# Patient Record
Sex: FEMALE | Race: WHITE | NOT HISPANIC OR LATINO
[De-identification: names, ages, dates, MRNs, and addresses within clinical notes are randomized per-mention and may not be internally consistent; named-entity substitution may affect disease eponyms.]

---

## 2019-07-09 ENCOUNTER — FORM ENCOUNTER (OUTPATIENT)
Age: 44
End: 2019-07-09

## 2019-08-25 ENCOUNTER — FORM ENCOUNTER (OUTPATIENT)
Age: 44
End: 2019-08-25

## 2021-06-14 ENCOUNTER — FORM ENCOUNTER (OUTPATIENT)
Age: 46
End: 2021-06-14

## 2023-01-11 PROBLEM — Z00.00 ENCOUNTER FOR PREVENTIVE HEALTH EXAMINATION: Status: ACTIVE | Noted: 2023-01-11

## 2023-02-28 ENCOUNTER — APPOINTMENT (OUTPATIENT)
Dept: OBGYN | Facility: CLINIC | Age: 48
End: 2023-02-28
Payer: COMMERCIAL

## 2023-02-28 VITALS
BODY MASS INDEX: 40.81 KG/M2 | DIASTOLIC BLOOD PRESSURE: 78 MMHG | WEIGHT: 260 LBS | HEART RATE: 84 BPM | RESPIRATION RATE: 16 BRPM | SYSTOLIC BLOOD PRESSURE: 124 MMHG | HEIGHT: 67 IN | OXYGEN SATURATION: 98 %

## 2023-02-28 DIAGNOSIS — D25.9 LEIOMYOMA OF UTERUS, UNSPECIFIED: ICD-10-CM

## 2023-02-28 LAB
BILIRUB UR QL STRIP: NORMAL
CLARITY UR: CLEAR
COLLECTION METHOD: NORMAL
GLUCOSE UR-MCNC: NORMAL
HCG UR QL: 0.2 EU/DL
HCG UR QL: NEGATIVE
HGB UR QL STRIP.AUTO: NORMAL
KETONES UR-MCNC: NORMAL
LEUKOCYTE ESTERASE UR QL STRIP: NORMAL
NITRITE UR QL STRIP: NORMAL
PH UR STRIP: 5.5
PROT UR STRIP-MCNC: NORMAL
QUALITY CONTROL: YES
SP GR UR STRIP: 1.03

## 2023-02-28 PROCEDURE — 99386 PREV VISIT NEW AGE 40-64: CPT

## 2023-02-28 PROCEDURE — 81003 URINALYSIS AUTO W/O SCOPE: CPT | Mod: NC,QW

## 2023-02-28 PROCEDURE — 81025 URINE PREGNANCY TEST: CPT

## 2023-02-28 PROCEDURE — 82270 OCCULT BLOOD FECES: CPT

## 2023-02-28 NOTE — PHYSICAL EXAM
[Chaperone Present] : A chaperone was present in the examining room during all aspects of the physical examination [Appropriately responsive] : appropriately responsive [Alert] : alert [No Acute Distress] : no acute distress [No Lymphadenopathy] : no lymphadenopathy [Regular Rate Rhythm] : regular rate rhythm [No Murmurs] : no murmurs [Clear to Auscultation B/L] : clear to auscultation bilaterally [Soft] : soft [Non-tender] : non-tender [Non-distended] : non-distended [No HSM] : No HSM [No Lesions] : no lesions [No Mass] : no mass [Oriented x3] : oriented x3 [Examination Of The Breasts] : a normal appearance [No Masses] : no breast masses were palpable [Labia Majora] : normal [Labia Minora] : normal [Normal] : normal [Uterine Adnexae] : normal [FreeTextEntry1] : The documentation for this encounter was entered by Sal Watkins acting as a scribe for Dr. Oden.  [Occult Blood Positive] : was negative for occult blood analysis [FreeTextEntry6] : small fibroid uterus

## 2023-02-28 NOTE — HISTORY OF PRESENT ILLNESS
[N] : Patient is not sexually active [Y] : Positive pregnancy history [Hot Flashes] : hot flashes [Night Sweats] : night sweats [Currently In Menopause] : currently in menopause [Experiencing Menopausal Sxs] : experiencing menopausal symptoms [Currently Active] : currently active [Mammogramdate] : 08/19 [BreastSonogramDate] : 08/19 [PapSmeardate] : 02/22 [LMPDate] : 09/01/23 [PGHxTotal] : 3 [TextBox_6] : 09/01/23 [FreeTextEntry1] : 09/01/23

## 2023-03-02 LAB
HPV 16 E6+E7 MRNA CVX QL NAA+PROBE: NOT DETECTED
HPV18+45 E6+E7 MRNA CVX QL NAA+PROBE: NOT DETECTED

## 2023-03-03 LAB — CYTOLOGY CVX/VAG DOC THIN PREP: NORMAL

## 2024-01-10 ENCOUNTER — APPOINTMENT (OUTPATIENT)
Dept: ENDOCRINOLOGY | Facility: CLINIC | Age: 49
End: 2024-01-10
Payer: COMMERCIAL

## 2024-01-10 VITALS
OXYGEN SATURATION: 97 % | RESPIRATION RATE: 18 BRPM | SYSTOLIC BLOOD PRESSURE: 139 MMHG | BODY MASS INDEX: 41.44 KG/M2 | HEART RATE: 76 BPM | HEIGHT: 67 IN | DIASTOLIC BLOOD PRESSURE: 90 MMHG | WEIGHT: 264 LBS

## 2024-01-10 DIAGNOSIS — Z13.820 ENCOUNTER FOR SCREENING FOR OSTEOPOROSIS: ICD-10-CM

## 2024-01-10 DIAGNOSIS — E53.8 DEFICIENCY OF OTHER SPECIFIED B GROUP VITAMINS: ICD-10-CM

## 2024-01-10 DIAGNOSIS — E55.9 VITAMIN D DEFICIENCY, UNSPECIFIED: ICD-10-CM

## 2024-01-10 PROCEDURE — 99205 OFFICE O/P NEW HI 60 MIN: CPT

## 2024-01-10 NOTE — ASSESSMENT
[FreeTextEntry1] : 48 year old female  with a past medical history of Hashimoto's hypothyroidism, Vitamin D deficiency, B12 deficiency who presents for management  1.  Hypothyroidism  Hashimoto's Thyroiditis  Will repeat labs and also check thyroid antibodies Blood was drawn in the office today Continue levothyroxine 125 mcg QD Continue liothyronine 5 mcg daily  2.  Vitamin B-12 deficiency Will check vitamin B12 levels  3. vitamin D deficiency Will check vitamin D  4.  Menopause Will check hormones to confirm Screen for osteoporosis given her early presentation

## 2024-01-19 LAB
25(OH)D3 SERPL-MCNC: 47.2 NG/ML
ALBUMIN SERPL ELPH-MCNC: 5.4 G/DL
ALP BLD-CCNC: 60 U/L
ALT SERPL-CCNC: 45 U/L
ANION GAP SERPL CALC-SCNC: 14 MMOL/L
AST SERPL-CCNC: 30 U/L
BILIRUB SERPL-MCNC: 1.9 MG/DL
BUN SERPL-MCNC: 18 MG/DL
CALCIUM SERPL-MCNC: 10.1 MG/DL
CHLORIDE SERPL-SCNC: 104 MMOL/L
CHOLEST SERPL-MCNC: 215 MG/DL
CO2 SERPL-SCNC: 24 MMOL/L
CREAT SERPL-MCNC: 1.16 MG/DL
EGFR: 58 ML/MIN/1.73M2
ESTIMATED AVERAGE GLUCOSE: 114 MG/DL
ESTRADIOL SERPL-MCNC: 18 PG/ML
FOLATE SERPL-MCNC: 14.9 NG/ML
FSH SERPL-MCNC: 87.7 IU/L
GLUCOSE SERPL-MCNC: 111 MG/DL
HBA1C MFR BLD HPLC: 5.6 %
HCT VFR BLD CALC: 47 %
HDLC SERPL-MCNC: 116 MG/DL
HGB BLD-MCNC: 15.2 G/DL
LDLC SERPL CALC-MCNC: 87 MG/DL
MCHC RBC-ENTMCNC: 31.7 PG
MCHC RBC-ENTMCNC: 32.3 GM/DL
MCV RBC AUTO: 97.9 FL
NONHDLC SERPL-MCNC: 99 MG/DL
PLATELET # BLD AUTO: 296 K/UL
POTASSIUM SERPL-SCNC: 4.3 MMOL/L
PROT SERPL-MCNC: 7.4 G/DL
RBC # BLD: 4.8 M/UL
RBC # FLD: 12.7 %
SODIUM SERPL-SCNC: 142 MMOL/L
T4 FREE SERPL-MCNC: 1.7 NG/DL
THYROGLOB AB SERPL-ACNC: <20 IU/ML
THYROPEROXIDASE AB SERPL IA-ACNC: 659 IU/ML
TRIGL SERPL-MCNC: 73 MG/DL
TSH SERPL-ACNC: 0.71 UIU/ML
VIT B12 SERPL-MCNC: 1360 PG/ML
WBC # FLD AUTO: 7.12 K/UL

## 2024-01-20 ENCOUNTER — APPOINTMENT (OUTPATIENT)
Dept: RADIOLOGY | Facility: CLINIC | Age: 49
End: 2024-01-20
Payer: COMMERCIAL

## 2024-01-20 PROCEDURE — 77080 DXA BONE DENSITY AXIAL: CPT

## 2024-05-30 LAB
25(OH)D3 SERPL-MCNC: 51 NG/ML
ALBUMIN SERPL ELPH-MCNC: 4.9 G/DL
ALP BLD-CCNC: 57 U/L
ALT SERPL-CCNC: 45 U/L
ANION GAP SERPL CALC-SCNC: 12 MMOL/L
AST SERPL-CCNC: 31 U/L
BILIRUB SERPL-MCNC: 1.4 MG/DL
BUN SERPL-MCNC: 17 MG/DL
CALCIUM SERPL-MCNC: 10.1 MG/DL
CHLORIDE SERPL-SCNC: 104 MMOL/L
CHOLEST SERPL-MCNC: 313 MG/DL
CO2 SERPL-SCNC: 23 MMOL/L
CREAT SERPL-MCNC: 1.2 MG/DL
EGFR: 55 ML/MIN/1.73M2
ESTRADIOL SERPL-MCNC: 8 PG/ML
FSH SERPL-MCNC: 83.7 IU/L
GLUCOSE SERPL-MCNC: 105 MG/DL
HCT VFR BLD CALC: 42.1 %
HDLC SERPL-MCNC: 107 MG/DL
HGB BLD-MCNC: 14.3 G/DL
LDLC SERPL CALC-MCNC: 187 MG/DL
MCHC RBC-ENTMCNC: 31.6 PG
MCHC RBC-ENTMCNC: 34 GM/DL
MCV RBC AUTO: 92.9 FL
NONHDLC SERPL-MCNC: 206 MG/DL
PLATELET # BLD AUTO: 293 K/UL
POTASSIUM SERPL-SCNC: 4.8 MMOL/L
PROT SERPL-MCNC: 7 G/DL
RBC # BLD: 4.53 M/UL
RBC # FLD: 12.6 %
SODIUM SERPL-SCNC: 139 MMOL/L
T3FREE SERPL-MCNC: 3.18 PG/ML
T4 SERPL-MCNC: 7.3 UG/DL
TRIGL SERPL-MCNC: 116 MG/DL
TSH SERPL-ACNC: 0.65 UIU/ML
VIT B12 SERPL-MCNC: 955 PG/ML
WBC # FLD AUTO: 6.81 K/UL

## 2024-06-05 ENCOUNTER — APPOINTMENT (OUTPATIENT)
Dept: ENDOCRINOLOGY | Facility: CLINIC | Age: 49
End: 2024-06-05
Payer: COMMERCIAL

## 2024-06-05 VITALS
SYSTOLIC BLOOD PRESSURE: 119 MMHG | DIASTOLIC BLOOD PRESSURE: 81 MMHG | HEIGHT: 67 IN | WEIGHT: 269 LBS | HEART RATE: 68 BPM | OXYGEN SATURATION: 97 % | BODY MASS INDEX: 42.22 KG/M2

## 2024-06-05 DIAGNOSIS — Z78.0 ASYMPTOMATIC MENOPAUSAL STATE: ICD-10-CM

## 2024-06-05 PROCEDURE — G2211 COMPLEX E/M VISIT ADD ON: CPT | Mod: NC

## 2024-06-05 PROCEDURE — 99214 OFFICE O/P EST MOD 30 MIN: CPT | Mod: 25

## 2024-06-05 RX ORDER — LEVOTHYROXINE SODIUM 0.12 MG/1
125 TABLET ORAL DAILY
Qty: 90 | Refills: 3 | Status: ACTIVE | COMMUNITY
Start: 1900-01-01 | End: 1900-01-01

## 2024-06-05 RX ORDER — LIOTHYRONINE SODIUM 5 UG/1
5 TABLET ORAL
Qty: 90 | Refills: 3 | Status: ACTIVE | COMMUNITY
Start: 1900-01-01 | End: 1900-01-01

## 2024-06-05 NOTE — ASSESSMENT
[FreeTextEntry1] : 49 year old female  with a past medical history of Hashimoto's hypothyroidism, Vitamin D deficiency, B12 deficiency who presents for management  1. Hypothyroidism  Hashimoto's Thyroiditis  Levels are stable Continue levothyroxine 125 mcg QD Continue liothyronine 5 mcg daily  2.  Vitamin B-12 deficiency Stable  3. vitamin D deficiency Stable  4.  Obesity Will refer to weight loss program

## 2024-06-05 NOTE — HISTORY OF PRESENT ILLNESS
[FreeTextEntry1] : Ms. DONOVAN ARELLANO is a 49 year old female  with a past medical history of Hashimoto's hypothyroidism, Vitamin D deficiency, B12 deficiency who presents for management.  Patient feels well overall.  She is struggling with losing weight.

## 2024-06-18 ENCOUNTER — NON-APPOINTMENT (OUTPATIENT)
Age: 49
End: 2024-06-18

## 2024-06-18 DIAGNOSIS — Z12.39 ENCOUNTER FOR OTHER SCREENING FOR MALIGNANT NEOPLASM OF BREAST: ICD-10-CM

## 2024-06-18 DIAGNOSIS — Z11.3 ENCOUNTER FOR SCREENING FOR INFECTIONS WITH A PREDOMINANTLY SEXUAL MODE OF TRANSMISSION: ICD-10-CM

## 2024-06-19 ENCOUNTER — APPOINTMENT (OUTPATIENT)
Dept: OBGYN | Facility: CLINIC | Age: 49
End: 2024-06-19
Payer: COMMERCIAL

## 2024-06-19 ENCOUNTER — LABORATORY RESULT (OUTPATIENT)
Age: 49
End: 2024-06-19

## 2024-06-19 VITALS
HEIGHT: 67 IN | HEART RATE: 100 BPM | SYSTOLIC BLOOD PRESSURE: 140 MMHG | BODY MASS INDEX: 42.38 KG/M2 | OXYGEN SATURATION: 98 % | RESPIRATION RATE: 18 BRPM | DIASTOLIC BLOOD PRESSURE: 82 MMHG | WEIGHT: 270 LBS

## 2024-06-19 DIAGNOSIS — Z82.49 FAMILY HISTORY OF ISCHEMIC HEART DISEASE AND OTHER DISEASES OF THE CIRCULATORY SYSTEM: ICD-10-CM

## 2024-06-19 DIAGNOSIS — Z12.39 ENCOUNTER FOR OTHER SCREENING FOR MALIGNANT NEOPLASM OF BREAST: ICD-10-CM

## 2024-06-19 DIAGNOSIS — Z86.39 PERSONAL HISTORY OF OTHER ENDOCRINE, NUTRITIONAL AND METABOLIC DISEASE: ICD-10-CM

## 2024-06-19 DIAGNOSIS — Z01.419 ENCOUNTER FOR GYNECOLOGICAL EXAMINATION (GENERAL) (ROUTINE) W/OUT ABNORMAL FINDINGS: ICD-10-CM

## 2024-06-19 DIAGNOSIS — Z13.31 ENCOUNTER FOR SCREENING FOR DEPRESSION: ICD-10-CM

## 2024-06-19 DIAGNOSIS — Z12.11 ENCOUNTER FOR SCREENING FOR MALIGNANT NEOPLASM OF COLON: ICD-10-CM

## 2024-06-19 DIAGNOSIS — Z83.438 FAMILY HISTORY OF OTHER DISORDER OF LIPOPROTEIN METABOLISM AND OTHER LIPIDEMIA: ICD-10-CM

## 2024-06-19 DIAGNOSIS — Z12.4 ENCOUNTER FOR SCREENING FOR MALIGNANT NEOPLASM OF CERVIX: ICD-10-CM

## 2024-06-19 PROCEDURE — 82270 OCCULT BLOOD FECES: CPT

## 2024-06-19 PROCEDURE — 99396 PREV VISIT EST AGE 40-64: CPT

## 2024-06-19 PROCEDURE — 99459 PELVIC EXAMINATION: CPT

## 2024-06-19 NOTE — PLAN
[FreeTextEntry1] : 1)  Self breast exam instructions, mammography discussed with the patient. 2)  Lipid profile assessment, TSH screening, fasting glucose testing, and vitamin D supplementation were discussed with the patient. 3)  Maintain healthy weight. 4)  Regular health maintenance with PCP. 5)  Remain tobacco free. 6)  Limit alcohol intake to less than 5 drinks per week. 7)  Osteoporosis screening and colonoscopy screening. 8)  Annual cholesterol screening. 9)  Annual influenza vaccine. 10) The importance of routine physical activity was reviewed and a goal of 150 minutes of moderately vigorous exercise per week was endorsed.   Yearly breast cancer screening with no current clinical or radiographic concerns. The patient was reminded regarding future well breast and general healthcare, breast cancer risk reduction, the importance of self-examination and the need for follow up. She was again reminded of the need to take Vit D3 2500 IU daily or to keep a Vit D level above 30, and Turmeric 1000 mg daily with Black Pepper. Plan continued yearly imaging and breast follow up, sooner as needed. I counseled the patient on current recommendations to reduce breast cancer risk including but not inclusive to regular exercise 20-30 minutes 3-4 times a week, low fat diet, limiting alcohol consumption, maintenance of ideal body weight, yearly imaging and self-breast awareness. Questions answered. I encouraged in light of COVID-19, social distancing, frequent hand washing and precautions to stay healthy.   I have spent 40 minutes of time on this encounter. Greater than 50% of the face-to-face encounter time was spent on counseling and/or coordination of care for examination findings, differential, testing, management and planning.

## 2024-06-19 NOTE — HISTORY OF PRESENT ILLNESS
[N] : Patient is not sexually active [Y] : Positive pregnancy history [Yes] : pregnancy [Hot Flashes] : hot flashes [Experiencing Menopausal Sxs] : experiencing menopausal symptoms [No] : Patient does not have concerns regarding sex [Previously active] : previously active [Men] : men [TextBox_4] : The 49-year-old patient presents today for a routine GYN exam. Patient is doing well. She offers no complaints. We reviewed together in detail her past medical and surgical histories, allergies and medication usage, social and family history. All questions were answered in easy-to-understand language. [Mammogramdate] : 4/22/2023 [BreastSonogramDate] : 4/22/2023 [PapSmeardate] : 2/28/2023 [BoneDensityDate] : 1/20/2024 [TextBox_78] : no hx [LMPDate] : 2023 [PGHxTotal] : 3 [[facility]] : [facility]

## 2024-06-19 NOTE — PHYSICAL EXAM
[Chaperone Present] : A chaperone was present in the examining room during all aspects of the physical examination [[id number]] : A chaperone was present during the pelvic exam. [Appropriately responsive] : appropriately responsive [Alert] : alert [No Acute Distress] : no acute distress [No Lymphadenopathy] : no lymphadenopathy [Soft] : soft [Non-tender] : non-tender [Non-distended] : non-distended [No HSM] : No HSM [No Lesions] : no lesions [No Mass] : no mass [Oriented x3] : oriented x3 [Examination Of The Breasts] : a normal appearance [No Masses] : no breast masses were palpable [Labia Majora] : normal [Labia Minora] : normal [Uterine Adnexae] : normal [Normal rectal exam] : was normal [Normal Brown Stool] : was normal and brown [Normal] : was normal [None] : there was no rectal mass  [FreeTextEntry2] : Vivienne Gresham [FreeTextEntry3] : This note was written by Vivienne Gresham on 06/19/2024, acting as a scribe for Dr. Mahin Oden MD. All medic record entries were at my, Dr. Mahin Oden MD, direction and personally dictated by me in 06/19/2024. I have personally reviewed the chart and agree that the record accurately reflects my personal performance of the history, physical exam, assessment, and plan.  [Occult Blood Positive] : was negative for occult blood analysis [Internal Hemorrhoid] : no internal hemorrhoids were present [External Hemorrhoid] : no external hemorrhoids were present [Skin Tags] : no residual hemorrhoidal skin tags

## 2024-06-24 LAB — CYTOLOGY CVX/VAG DOC THIN PREP: NORMAL

## 2024-07-10 ENCOUNTER — APPOINTMENT (OUTPATIENT)
Dept: ENDOCRINOLOGY | Facility: CLINIC | Age: 49
End: 2024-07-10

## 2024-07-23 ENCOUNTER — APPOINTMENT (OUTPATIENT)
Dept: BARIATRICS | Facility: CLINIC | Age: 49
End: 2024-07-23
Payer: COMMERCIAL

## 2024-07-23 ENCOUNTER — TRANSCRIPTION ENCOUNTER (OUTPATIENT)
Age: 49
End: 2024-07-23

## 2024-07-23 VITALS
DIASTOLIC BLOOD PRESSURE: 74 MMHG | TEMPERATURE: 97.2 F | SYSTOLIC BLOOD PRESSURE: 130 MMHG | WEIGHT: 271.6 LBS | BODY MASS INDEX: 42.63 KG/M2 | HEIGHT: 67 IN

## 2024-07-23 DIAGNOSIS — Z78.9 OTHER SPECIFIED HEALTH STATUS: ICD-10-CM

## 2024-07-23 DIAGNOSIS — R63.8 OTHER SYMPTOMS AND SIGNS CONCERNING FOOD AND FLUID INTAKE: ICD-10-CM

## 2024-07-23 DIAGNOSIS — Z76.89 PERSONS ENCOUNTERING HEALTH SERVICES IN OTHER SPECIFIED CIRCUMSTANCES: ICD-10-CM

## 2024-07-23 DIAGNOSIS — E66.01 MORBID (SEVERE) OBESITY DUE TO EXCESS CALORIES: ICD-10-CM

## 2024-07-23 DIAGNOSIS — R63.5 ABNORMAL WEIGHT GAIN: ICD-10-CM

## 2024-07-23 DIAGNOSIS — E46 UNSPECIFIED PROTEIN-CALORIE MALNUTRITION: ICD-10-CM

## 2024-07-23 DIAGNOSIS — R63.2 POLYPHAGIA: ICD-10-CM

## 2024-07-23 PROCEDURE — 99205 OFFICE O/P NEW HI 60 MIN: CPT

## 2024-07-24 LAB — HCG UR QL: NEGATIVE

## 2024-07-24 RX ORDER — SEMAGLUTIDE 0.25 MG/.5ML
0.25 INJECTION, SOLUTION SUBCUTANEOUS
Qty: 1 | Refills: 0 | Status: ACTIVE | COMMUNITY
Start: 2024-07-24 | End: 1900-01-01

## 2024-07-24 NOTE — HISTORY OF PRESENT ILLNESS
[de-identified] : DONOVAN ARELLANO is a 49-year-old F with a long-standing Hx of obesity who presents today for initial evaluation for weight management options.   Heaviest/current wt 275 lbs/271 lbs Diets/exercise programs tried in the past: WW (50 lb weight loss), Keto- diet Weight loss medications tried in the past: no   PMH and FH of: pancreatitis: no gallstones: no kidney stones: no MEN syndrome: no Medullary thyroid cancer: no Anxiety: no Glaucoma: no Blind in one eye or uncorrectable vision: no Currently taking narcotic pain medication(s) or suboxone: no   History of bariatric surgery: no Obesity comorbidities: none Anti-obesity medication: none   Current dietary lifestyle: Breakfast: Eggs, avocado, coffee OR low carbohydrate English muffin Lunch: Dinner leftovers Dinner: Chicken or beef with vegetables and salad Snacks: pretzels, yogurt, almonds, fruit Drinks: coffee, water   Activity Lifestyle: Sleep: ~8 hrs/night (no snoring) Physical activity/exercise: daily activity (~8-10k steps/day), [employer] Work:  Smoking/EtOH: nonsmoker; no EtOH use   Last Mammogram: 6/2024 Last Pap Smear: 6/19/24

## 2024-07-24 NOTE — PHYSICAL EXAM
[Obese, well nourished, in no acute distress] : obese, well nourished, in no acute distress [Normal] : affect appropriate [de-identified] : No visualized JVD or audible bruits  [de-identified] : Equal chest rise, non-labored respirations, no audible wheezing.  [de-identified] : soft, NT, ND, no evidence of hernia; diastasis observed; obese

## 2024-07-24 NOTE — HISTORY OF PRESENT ILLNESS
[de-identified] : DONOVAN ARELLANO is a 49-year-old F with a long-standing Hx of obesity who presents today for initial evaluation for weight management options.   Heaviest/current wt 275 lbs/271 lbs Diets/exercise programs tried in the past: WW (50 lb weight loss), Keto- diet Weight loss medications tried in the past: no   PMH and FH of: pancreatitis: no gallstones: no kidney stones: no MEN syndrome: no Medullary thyroid cancer: no Anxiety: no Glaucoma: no Blind in one eye or uncorrectable vision: no Currently taking narcotic pain medication(s) or suboxone: no   History of bariatric surgery: no Obesity comorbidities: none Anti-obesity medication: none   Current dietary lifestyle: Breakfast: Eggs, avocado, coffee OR low carbohydrate English muffin Lunch: Dinner leftovers Dinner: Chicken or beef with vegetables and salad Snacks: pretzels, yogurt, almonds, fruit Drinks: coffee, water   Activity Lifestyle: Sleep: ~8 hrs/night (no snoring) Physical activity/exercise: daily activity (~8-10k steps/day), [employer] Work:  Smoking/EtOH: nonsmoker; no EtOH use   Last Mammogram: 6/2024 Last Pap Smear: 6/19/24

## 2024-07-24 NOTE — PHYSICAL EXAM
[Obese, well nourished, in no acute distress] : obese, well nourished, in no acute distress [Normal] : affect appropriate [de-identified] : No visualized JVD or audible bruits  [de-identified] : Equal chest rise, non-labored respirations, no audible wheezing.  [de-identified] : soft, NT, ND, no evidence of hernia; diastasis observed; obese

## 2024-07-24 NOTE — ASSESSMENT
[FreeTextEntry1] : DONOVAN ARELLANO is a 49-year-old F with a long-standing h/o obesity, who presents today for initial evaluation for weight management options.   Had a lengthy discussion with the patient regarding nutrition, exercise, weight loss medications, and bariatric surgery.  Discussed how bariatric surgery may offer greater weight loss results with better long-term success. The patient qualifies for and is most interested in weight loss medications.   Health maintenance and behavioral/nutrition counseling for obesity: An additional 30 minutes of the visit was spent counseling the patient regarding need for aggressive weight loss and behavior modification including nutrition and proper eating habits, including which foods to eat and avoid.   Emphasized the importance of making healthier food choices including fresh fruits and vegetables, lean meats, and protein sources. Recommended front loading calories, incorporating whole grains, and eliminating fast foods. I also discussed the importance of avoiding fried/fatty foods and foods containing high sugar content including juices/shakes/sodas/desserts.   Also encouraged beginning an exercise program and recommended cardiovascular exercises along with strength training to build lean muscle. Made suggestions on different types of exercises to try.   Patient will work on the following:   -Focus on eating 3 well-balanced meals with lean protein and fiber during the daytime with appropriate portion size  -Cooking fresh meals rather than take out/processed/ready-made foods  -Try to make vegetables the largest portion of each meal, followed by a lean protein (e.g. lentils, chicken, fish,    beans), and finally a complex carbohydrate if still hungry (e.g. sweet potato, farro, quinoa)   -Consider bison burger instead of beef burger  -Limit almonds to 6-12/day  -Limit fruit to two pieces per day  -Incorporating exercise (aim for 150 mins/week with both cardio- and strength-training)   Start GLP-1 agonist based on authorization and labs (ordered). Currently there are no contraindications for the use of GLP-1 agonist after reviewing the pt's medical history and labs, including personal and family history of thyroid cancer or MEN 2 Syndrome. Possible side effects of GLP-1 agonist were discussed with the patient, including nausea, abdominal pain, reflux, constipation, delayed gastric emptying, gallstones, kidney stones, visual changes, and pancreatitis.  Instructions for use provided. Pt understands the need for long-term use and understands the risk of weight-gain upon stopping weight loss medications.  If GLP-1 agonist is not covered by the pt's insurance, will discuss starting the pt on Phentermine-Topiramate. Currently there are no contraindications for the use of Phentermine-Topiramate after reviewing the patient's medical history and labs, including CAD, arrhythmias, severe anxiety, Hx of substance abuse or glaucoma. Possible side effects were discussed with the pt including increased anxiety, tachycardia, elevated BP, paresthesia, and birth defects in the case of pregnancy.  Instructions for use provided. Pt understands the need for long-term use and understands the risk of weight-gain upon stopping weight loss medications.   Patient educated to call with questions/concerns All questions answered Return to office 3 weeks after starting GLP-1 agonist; call the office right away with any side effects   Additional time spent before and after visit reviewing chart.

## 2024-10-15 ENCOUNTER — APPOINTMENT (OUTPATIENT)
Dept: BARIATRICS | Facility: CLINIC | Age: 49
End: 2024-10-15
Payer: COMMERCIAL

## 2024-10-15 VITALS
HEIGHT: 67 IN | DIASTOLIC BLOOD PRESSURE: 80 MMHG | BODY MASS INDEX: 41.14 KG/M2 | OXYGEN SATURATION: 96 % | HEART RATE: 63 BPM | TEMPERATURE: 98 F | SYSTOLIC BLOOD PRESSURE: 130 MMHG | WEIGHT: 262.11 LBS

## 2024-10-15 DIAGNOSIS — K59.03 DRUG INDUCED CONSTIPATION: ICD-10-CM

## 2024-10-15 DIAGNOSIS — Z79.899 OTHER LONG TERM (CURRENT) DRUG THERAPY: ICD-10-CM

## 2024-10-15 DIAGNOSIS — E66.01 MORBID (SEVERE) OBESITY DUE TO EXCESS CALORIES: ICD-10-CM

## 2024-10-15 DIAGNOSIS — R63.4 ABNORMAL WEIGHT LOSS: ICD-10-CM

## 2024-10-15 DIAGNOSIS — Z71.82 EXERCISE COUNSELING: ICD-10-CM

## 2024-10-15 PROCEDURE — G2211 COMPLEX E/M VISIT ADD ON: CPT | Mod: NC

## 2024-10-15 PROCEDURE — 99214 OFFICE O/P EST MOD 30 MIN: CPT

## 2024-10-15 RX ORDER — CHOLECALCIFEROL (VITAMIN D3) 25 MCG
TABLET ORAL DAILY
Refills: 0 | Status: ACTIVE | COMMUNITY

## 2024-10-15 NOTE — ASSESSMENT
[FreeTextEntry1] : 49 year old F with a long-standing h/o obesity, who presents today for follow up after starting weight loss medication Wegovy. Weight loss since last visit. Reports mild constipation that is relieved with increasing water intake.  Doing well overall.   Reviewed guidelines about nutrition and snacking - protein focus diet with 3 meals/day Discussed food choices and timing of meals with front loading early in the day Avoid processed/refined foods Maintain adequate fiber in diet - Colace or Miralax as needed for constipation, constipation remedies list given to pt Maintain daily exercise regimen incorporating cardio and strength training as tolerated Discussed importance of strength training Exercise packet given and exercises reviewed with pt Daily goal 8-10k steps/day   Return to office in 1 month Increase Wegovy to 0.5mg Prescription sent to pt's local pharmacy Fasting labs to be done Mar 2025 Instructed pt to call the office sooner for issues/concerns All questions answered     Additional time spent before and after visit reviewing chart

## 2024-10-15 NOTE — PHYSICAL EXAM
[Normal] : affect appropriate [de-identified] : Well, healthy appearing adult  [de-identified] : EOMI, no conjunctival injection, anicteric  [de-identified] : No visualized masses, JVD or audible bruits noted  [de-identified] : Equal chest rise, non-labored respirations, no audible wheezing  [de-identified] : Regular rate, no audible carotid bruits or JVD appreciated [de-identified] : soft, NT, ND, no diastasis or hernias appreciated, incision from Csec well healed  [de-identified] : ABDIRAHMAN

## 2024-10-15 NOTE — HISTORY OF PRESENT ILLNESS
[de-identified] : 49 year old F with a long-standing h/o obesity, who presents today for follow up after starting weight loss medication Wegovy. Weight loss since last visit. Reports mild constipation that is relieved with increasing water intake. Otherwise, no abdominal pain, reflux, nausea, vomiting, constipation or diarrhea, or visual changes. Consuming adequate protein intake with 3meals/day. Daily water intake adequate ~64oz/day. Walking for activities, plus [employer] 2x/wk. Sleeping well.   Starting weight at initial consult: 271 lb Current weight at today's visit: 262 lb   Anti-obesity medication(s): Wegovy Start date: Sept 2024 Current dose: 0.25mg (injection #2 of box #2 out of 2 10/15/2024) Side-effects from anti-obesity medication(s): pt reports mild constipation - see HPI Obesity comorbidities: none Comorbidities improved/resolved: N/A History of bariatric surgery: No

## 2024-10-15 NOTE — HISTORY OF PRESENT ILLNESS
[de-identified] : 49 year old F with a long-standing h/o obesity, who presents today for follow up after starting weight loss medication Wegovy. Weight loss since last visit. Reports mild constipation that is relieved with increasing water intake. Otherwise, no abdominal pain, reflux, nausea, vomiting, constipation or diarrhea, or visual changes. Consuming adequate protein intake with 3meals/day. Daily water intake adequate ~64oz/day. Walking for activities, plus [employer] 2x/wk. Sleeping well.   Starting weight at initial consult: 271 lb Current weight at today's visit: 262 lb   Anti-obesity medication(s): Wegovy Start date: Sept 2024 Current dose: 0.25mg (injection #2 of box #2 out of 2 10/15/2024) Side-effects from anti-obesity medication(s): pt reports mild constipation - see HPI Obesity comorbidities: none Comorbidities improved/resolved: N/A History of bariatric surgery: No

## 2024-11-11 ENCOUNTER — APPOINTMENT (OUTPATIENT)
Dept: BARIATRICS | Facility: CLINIC | Age: 49
End: 2024-11-11
Payer: COMMERCIAL

## 2024-11-11 VITALS
HEART RATE: 80 BPM | DIASTOLIC BLOOD PRESSURE: 78 MMHG | SYSTOLIC BLOOD PRESSURE: 130 MMHG | TEMPERATURE: 98.2 F | WEIGHT: 260.14 LBS | OXYGEN SATURATION: 97 % | BODY MASS INDEX: 40.83 KG/M2 | HEIGHT: 67 IN

## 2024-11-11 DIAGNOSIS — Z79.899 OTHER LONG TERM (CURRENT) DRUG THERAPY: ICD-10-CM

## 2024-11-11 DIAGNOSIS — R63.4 ABNORMAL WEIGHT LOSS: ICD-10-CM

## 2024-11-11 DIAGNOSIS — E66.01 MORBID (SEVERE) OBESITY DUE TO EXCESS CALORIES: ICD-10-CM

## 2024-11-11 DIAGNOSIS — Z71.82 EXERCISE COUNSELING: ICD-10-CM

## 2024-11-11 PROCEDURE — 99214 OFFICE O/P EST MOD 30 MIN: CPT

## 2024-11-11 NOTE — HISTORY OF PRESENT ILLNESS
[de-identified] : 49 year old F with a long-standing h/o obesity, who presents today for follow up after starting weight loss medication Wegovy. Weight loss since last visit, even with recent travel to [place]. Reports no abdominal pain, reflux, nausea, vomiting, constipation or diarrhea, or visual changes. Consuming adequate protein intake with 3meals/day. Daily water intake adequate >64oz/day. Walking for activities, plus [employer] 2x/wk. Sleeping well.   Starting weight at initial consult: 271 lb Current weight at today's visit: 260 lb   Anti-obesity medication(s): Wegovy Start date: Sept 2024 Current dose: 0.5mg Side-effects from anti-obesity medication(s): pt reports none Obesity comorbidities: none Comorbidities improved/resolved: N/A History of bariatric surgery: No

## 2024-11-11 NOTE — PHYSICAL EXAM
[Normal] : affect appropriate [de-identified] : Well, healthy appearing adult  [de-identified] : EOMI, no conjunctival injection, anicteric  [de-identified] : No visualized masses, JVD or audible bruits noted  [de-identified] : Equal chest rise, non-labored respirations, no audible wheezing  [de-identified] : Regular rate, no audible carotid bruits or JVD appreciated [de-identified] : soft, NT, ND, no diastasis or hernias appreciated, incision from Csec well healed  [de-identified] : ABDIRAHMAN

## 2024-11-11 NOTE — ASSESSMENT
[FreeTextEntry1] : 49 year old F with a long-standing h/o obesity, who presents today for follow up after starting weight loss medication Wegovy. Weight loss since last visit, even with recent travel to [place]. Doing well overall.   Reviewed guidelines about nutrition and snacking - protein focus diet with 3 meals/day Discussed food choices and timing of meals with front loading early in the day Avoid processed/refined foods Maintain adequate fiber in diet - Colace or Miralax as needed for constipation, constipation remedies list given to pt Maintain daily exercise regimen incorporating cardio and strength training as tolerated Discussed importance of strength training Reviewed exercises reviewed with pt Daily goal 8-10k steps/day   Return to office in 1 month Increase Wegovy to 1.0mg Prescription sent to pt's local pharmacy Fasting labs to be done Mar 2025 Instructed pt to call the office sooner for issues/concerns All questions answered     Additional time spent before and after visit reviewing chart

## 2024-12-13 ENCOUNTER — APPOINTMENT (OUTPATIENT)
Dept: BARIATRICS | Facility: CLINIC | Age: 49
End: 2024-12-13
Payer: COMMERCIAL

## 2024-12-13 VITALS
HEART RATE: 89 BPM | HEIGHT: 67 IN | SYSTOLIC BLOOD PRESSURE: 126 MMHG | DIASTOLIC BLOOD PRESSURE: 86 MMHG | BODY MASS INDEX: 39.83 KG/M2 | WEIGHT: 253.75 LBS | TEMPERATURE: 98.2 F | OXYGEN SATURATION: 98 %

## 2024-12-13 DIAGNOSIS — E66.9 OBESITY, UNSPECIFIED: ICD-10-CM

## 2024-12-13 DIAGNOSIS — Z01.812 ENCOUNTER FOR PREPROCEDURAL LABORATORY EXAMINATION: ICD-10-CM

## 2024-12-13 DIAGNOSIS — E56.9 VITAMIN DEFICIENCY, UNSPECIFIED: ICD-10-CM

## 2024-12-13 DIAGNOSIS — R63.8 OTHER SYMPTOMS AND SIGNS CONCERNING FOOD AND FLUID INTAKE: ICD-10-CM

## 2024-12-13 DIAGNOSIS — E46 UNSPECIFIED PROTEIN-CALORIE MALNUTRITION: ICD-10-CM

## 2024-12-13 DIAGNOSIS — R63.5 ABNORMAL WEIGHT GAIN: ICD-10-CM

## 2024-12-13 DIAGNOSIS — K21.9 GASTRO-ESOPHAGEAL REFLUX DISEASE W/OUT ESOPHAGITIS: ICD-10-CM

## 2024-12-13 DIAGNOSIS — R79.9 ABNORMAL FINDING OF BLOOD CHEMISTRY, UNSPECIFIED: ICD-10-CM

## 2024-12-13 DIAGNOSIS — Z79.899 OTHER LONG TERM (CURRENT) DRUG THERAPY: ICD-10-CM

## 2024-12-13 DIAGNOSIS — Z00.00 ENCOUNTER FOR GENERAL ADULT MEDICAL EXAMINATION W/OUT ABNORMAL FINDINGS: ICD-10-CM

## 2024-12-13 DIAGNOSIS — E06.3 AUTOIMMUNE THYROIDITIS: ICD-10-CM

## 2024-12-13 DIAGNOSIS — R63.2 POLYPHAGIA: ICD-10-CM

## 2024-12-13 DIAGNOSIS — E61.8 DEFICIENCY OF OTHER SPECIFIED NUTRIENT ELEMENTS: ICD-10-CM

## 2024-12-13 PROCEDURE — 99214 OFFICE O/P EST MOD 30 MIN: CPT

## 2024-12-15 PROBLEM — E61.8 INADEQUATE MINERAL INTAKE: Status: ACTIVE | Noted: 2024-12-15

## 2024-12-15 PROBLEM — Z79.899 ENCOUNTER FOR MEDICATION MANAGEMENT: Status: ACTIVE | Noted: 2024-12-15

## 2024-12-15 PROBLEM — E66.9 GENERALIZED OBESITY: Status: ACTIVE | Noted: 2024-12-15

## 2024-12-15 PROBLEM — Z01.812 BLOOD TESTS PRIOR TO TREATMENT OR PROCEDURE: Status: ACTIVE | Noted: 2024-12-15

## 2024-12-15 PROBLEM — K21.9 GASTROESOPHAGEAL REFLUX DISEASE, UNSPECIFIED WHETHER ESOPHAGITIS PRESENT: Status: ACTIVE | Noted: 2024-12-15

## 2024-12-15 PROBLEM — E06.3 HASHIMOTO'S THYROIDITIS: Status: ACTIVE | Noted: 2023-02-28

## 2024-12-15 PROBLEM — Z00.00 HEALTHCARE MAINTENANCE: Status: ACTIVE | Noted: 2024-12-15

## 2024-12-15 PROBLEM — R79.9 ABNORMAL FINDING OF BLOOD CHEMISTRY: Status: ACTIVE | Noted: 2024-12-15

## 2024-12-15 PROBLEM — E56.9 VITAMIN DEFICIENCY, UNSPECIFIED: Status: ACTIVE | Noted: 2024-12-15

## 2024-12-15 NOTE — ASSESSMENT
[FreeTextEntry1] : 49 year old F with a long-standing h/o obesity presents today for a weight management follow-up. 7 lb weight loss since last visit 1 month ago; on Wegovy 1mg. Reports having persistent reflux (since prior to starting wegovy) and taking TUMS daily.    Reviewed guidelines about nutrition and snacking - protein focus diet with 3 meals/day Discussed food choices and timing of meals with front loading early in the day Avoid processed/refined foods Maintain daily exercise regimen incorporating cardio and strength training as tolerated Discussed importance of strength training Reviewed exercises reviewed with pt Daily goal 8-10k steps/day Discussed seeing GI for GERD. Pt's family member had a complication after an EGD and is now fearful to undergo EGD. Discussed dietary changes and low acidic diet in depth and recommended starting on PPI or famotidine. Pt would like to try dietary changes first and if no improvement will consider medication. Thoroughly reviewed possible complications of untreated GERD. Pt reports she understands and would like to avoid undergoing GI work up at this time. She is aware that Wegovy can possibly worsen GERD and if symptoms worsen or do not improve with dietary changes then she may need to stop Wegovy.  Increase Wegovy to 1.7mg Follow up in 2 months or sooner Fasting labs due now Instructed pt to call the office sooner for issues/concerns All questions answered   Additional time spent before and after visit reviewing chart

## 2024-12-15 NOTE — PHYSICAL EXAM
[de-identified] : No visualized masses, JVD or audible bruits noted  [Normal] : supple without JVD, no thyromegaly or masses appreciated [de-identified] : Well, healthy appearing adult  [de-identified] : EOMI, no conjunctival injection, anicteric  [de-identified] : Equal chest rise, non-labored respirations, no audible wheezing  [de-identified] : soft, NT, ND, no diastasis or hernias appreciated, incision from Csec well healed  [de-identified] : ABDIRAHMAN

## 2024-12-15 NOTE — REVIEW OF SYSTEMS
[Negative] : Allergic/Immunologic [Reflux/Heartburn] : reflux/heartburn [Abdominal Pain] : no abdominal pain [Vomiting] : no vomiting [Constipation] : no constipation [Diarrhea] : no diarrhea [Hernia] : no hernia

## 2024-12-15 NOTE — HISTORY OF PRESENT ILLNESS
[de-identified] : 49 year old F with a long-standing h/o obesity presents today for a weight management follow-up. 7 lb weight loss since last visit 1 month ago; on Wegovy 1mg.  Reports having persistent reflux (since prior to starting wegovy) and taking TUMS daily. No abdominal pain, nausea, vomiting, constipation or diarrhea, or visual changes. Consuming adequate protein intake with 3meals/day. Daily water intake ~32oz/day and 16 oz of coffee daily. Going to orange theory 2x per week. Sleeping well.   Starting weight at initial consult: 271 lb Current weight at today's visit: 253 lbs   Anti-obesity medication(s): Wegovy Start date: Sept 2024 Current dose: 1mg Side-effects from anti-obesity medication(s): reflux Obesity comorbidities: none History of bariatric surgery: No

## 2024-12-15 NOTE — HISTORY OF PRESENT ILLNESS
[de-identified] : 49 year old F with a long-standing h/o obesity presents today for a weight management follow-up. 7 lb weight loss since last visit 1 month ago; on Wegovy 1mg.  Reports having persistent reflux (since prior to starting wegovy) and taking TUMS daily. No abdominal pain, nausea, vomiting, constipation or diarrhea, or visual changes. Consuming adequate protein intake with 3meals/day. Daily water intake ~32oz/day and 16 oz of coffee daily. Going to orange theory 2x per week. Sleeping well.   Starting weight at initial consult: 271 lb Current weight at today's visit: 253 lbs   Anti-obesity medication(s): Wegovy Start date: Sept 2024 Current dose: 1mg Side-effects from anti-obesity medication(s): reflux Obesity comorbidities: none History of bariatric surgery: No

## 2024-12-15 NOTE — HISTORY OF PRESENT ILLNESS
[de-identified] : 49 year old F with a long-standing h/o obesity presents today for a weight management follow-up. 7 lb weight loss since last visit 1 month ago; on Wegovy 1mg.  Reports having persistent reflux (since prior to starting wegovy) and taking TUMS daily. No abdominal pain, nausea, vomiting, constipation or diarrhea, or visual changes. Consuming adequate protein intake with 3meals/day. Daily water intake ~32oz/day and 16 oz of coffee daily. Going to orange theory 2x per week. Sleeping well.   Starting weight at initial consult: 271 lb Current weight at today's visit: 253 lbs   Anti-obesity medication(s): Wegovy Start date: Sept 2024 Current dose: 1mg Side-effects from anti-obesity medication(s): reflux Obesity comorbidities: none History of bariatric surgery: No

## 2024-12-15 NOTE — PHYSICAL EXAM
[de-identified] : No visualized masses, JVD or audible bruits noted  [Normal] : supple without JVD, no thyromegaly or masses appreciated [de-identified] : Well, healthy appearing adult  [de-identified] : EOMI, no conjunctival injection, anicteric  [de-identified] : Equal chest rise, non-labored respirations, no audible wheezing  [de-identified] : soft, NT, ND, no diastasis or hernias appreciated, incision from Csec well healed  [de-identified] : ABDIRAHMAN

## 2024-12-15 NOTE — PHYSICAL EXAM
[de-identified] : No visualized masses, JVD or audible bruits noted  [Normal] : supple without JVD, no thyromegaly or masses appreciated [de-identified] : Well, healthy appearing adult  [de-identified] : EOMI, no conjunctival injection, anicteric  [de-identified] : Equal chest rise, non-labored respirations, no audible wheezing  [de-identified] : soft, NT, ND, no diastasis or hernias appreciated, incision from Csec well healed  [de-identified] : ABDIRAHMAN

## 2025-02-03 DIAGNOSIS — R17 UNSPECIFIED JAUNDICE: ICD-10-CM

## 2025-02-22 ENCOUNTER — APPOINTMENT (OUTPATIENT)
Dept: CT IMAGING | Facility: CLINIC | Age: 50
End: 2025-02-22
Payer: COMMERCIAL

## 2025-02-22 PROCEDURE — 74177 CT ABD & PELVIS W/CONTRAST: CPT

## 2025-02-25 ENCOUNTER — APPOINTMENT (OUTPATIENT)
Dept: BARIATRICS | Facility: CLINIC | Age: 50
End: 2025-02-25

## 2025-02-25 VITALS
HEIGHT: 67 IN | SYSTOLIC BLOOD PRESSURE: 124 MMHG | HEART RATE: 92 BPM | BODY MASS INDEX: 37.45 KG/M2 | OXYGEN SATURATION: 98 % | DIASTOLIC BLOOD PRESSURE: 82 MMHG | TEMPERATURE: 97.2 F | WEIGHT: 238.6 LBS

## 2025-02-25 DIAGNOSIS — D36.14 BENIGN NEOPLASM OF PERIPHERAL NERVES AND AUTONOMIC NERVOUS SYSTEM OF THORAX: ICD-10-CM

## 2025-02-25 DIAGNOSIS — D36.10 BENIGN NEOPLASM OF PERIPHERAL NERVES AND AUTONOMIC NERVOUS SYSTEM, UNSPECIFIED: ICD-10-CM

## 2025-02-25 DIAGNOSIS — E66.01 MORBID (SEVERE) OBESITY DUE TO EXCESS CALORIES: ICD-10-CM

## 2025-02-25 PROCEDURE — 99215 OFFICE O/P EST HI 40 MIN: CPT

## 2025-03-04 DIAGNOSIS — Z76.0 ENCOUNTER FOR ISSUE OF REPEAT PRESCRIPTION: ICD-10-CM

## 2025-03-04 DIAGNOSIS — Z13.220 ENCOUNTER FOR SCREENING FOR LIPOID DISORDERS: ICD-10-CM

## 2025-03-05 ENCOUNTER — APPOINTMENT (OUTPATIENT)
Facility: CLINIC | Age: 50
End: 2025-03-05
Payer: COMMERCIAL

## 2025-03-05 VITALS
OXYGEN SATURATION: 99 % | TEMPERATURE: 98.6 F | RESPIRATION RATE: 17 BRPM | HEART RATE: 66 BPM | DIASTOLIC BLOOD PRESSURE: 92 MMHG | SYSTOLIC BLOOD PRESSURE: 131 MMHG | BODY MASS INDEX: 37.35 KG/M2 | WEIGHT: 238 LBS | HEIGHT: 67 IN

## 2025-03-05 DIAGNOSIS — E03.9 HYPOTHYROIDISM, UNSPECIFIED: ICD-10-CM

## 2025-03-05 DIAGNOSIS — Z78.9 OTHER SPECIFIED HEALTH STATUS: ICD-10-CM

## 2025-03-05 DIAGNOSIS — Z83.438 FAMILY HISTORY OF OTHER DISORDER OF LIPOPROTEIN METABOLISM AND OTHER LIPIDEMIA: ICD-10-CM

## 2025-03-05 DIAGNOSIS — K21.9 GASTRO-ESOPHAGEAL REFLUX DISEASE W/OUT ESOPHAGITIS: ICD-10-CM

## 2025-03-05 DIAGNOSIS — R17 UNSPECIFIED JAUNDICE: ICD-10-CM

## 2025-03-05 DIAGNOSIS — E66.9 OBESITY, UNSPECIFIED: ICD-10-CM

## 2025-03-05 DIAGNOSIS — Z82.49 FAMILY HISTORY OF ISCHEMIC HEART DISEASE AND OTHER DISEASES OF THE CIRCULATORY SYSTEM: ICD-10-CM

## 2025-03-05 DIAGNOSIS — E06.3 AUTOIMMUNE THYROIDITIS: ICD-10-CM

## 2025-03-05 PROCEDURE — 94727 GAS DIL/WSHOT DETER LNG VOL: CPT

## 2025-03-05 PROCEDURE — 99204 OFFICE O/P NEW MOD 45 MIN: CPT | Mod: 25

## 2025-03-05 PROCEDURE — 94060 EVALUATION OF WHEEZING: CPT

## 2025-03-05 PROCEDURE — 94729 DIFFUSING CAPACITY: CPT

## 2025-03-05 NOTE — HISTORY OF PRESENT ILLNESS
[TextBox_4] : 49-year-old female had CT of abdomen due to elevated bilirubin.  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  She denies shortness of breath cough wheezing or chest pain.  She denies any other lesions anywhere on her body.  No prior pulmonary disease

## 2025-03-05 NOTE — PHYSICAL EXAM
[No Acute Distress] : no acute distress [Normal Oropharynx] : normal oropharynx [Normal Appearance] : normal appearance [No Neck Mass] : no neck mass [Normal Rate/Rhythm] : normal rate/rhythm [Normal S1, S2] : normal s1, s2 [No Murmurs] : no murmurs [No Resp Distress] : no resp distress [No Acc Muscle Use] : no acc muscle use [Clear to Auscultation Bilaterally] : clear to auscultation bilaterally [Normal to Percussion] : normal to percussion [No Abnormalities] : no abnormalities [Benign] : benign [Not Tender] : not tender [No Masses] : no masses [Soft] : soft [No Clubbing] : no clubbing [No Cyanosis] : no cyanosis [No Edema] : no edema [Normal Color/ Pigmentation] : normal color/ pigmentation [No Skin Lesions] : no skin lesions [Normal Affect] : normal affect [TextBox_2] : overweight female

## 2025-03-05 NOTE — REVIEW OF SYSTEMS
DISPLAY PLAN FREE TEXT DISPLAY PLAN FREE TEXT DISPLAY PLAN FREE TEXT DISPLAY PLAN FREE TEXT DISPLAY PLAN FREE TEXT DISPLAY PLAN FREE TEXT DISPLAY PLAN FREE TEXT DISPLAY PLAN FREE TEXT DISPLAY PLAN FREE TEXT DISPLAY PLAN FREE TEXT DISPLAY PLAN FREE TEXT DISPLAY PLAN FREE TEXT [Negative] : Endocrine

## 2025-03-05 NOTE — CONSULT LETTER
[Dear  ___] : Dear  [unfilled], [Consult Letter:] : I had the pleasure of evaluating your patient, [unfilled]. [Please see my note below.] : Please see my note below. [Consult Closing:] : Thank you very much for allowing me to participate in the care of this patient.  If you have any questions, please do not hesitate to contact me. [Sincerely,] : Sincerely, [FreeTextEntry2] : Iman Oneill [FreeTextEntry3] :  Harry Guerra MD FACP FCCP Pulmonary Diseases and Critical Care Medicine. Chief, Pulmonary Diseases, [facility]

## 2025-03-05 NOTE — CONSULT LETTER
[Dear  ___] : Dear  [unfilled], [Consult Letter:] : I had the pleasure of evaluating your patient, [unfilled]. [Please see my note below.] : Please see my note below. [Consult Closing:] : Thank you very much for allowing me to participate in the care of this patient.  If you have any questions, please do not hesitate to contact me. [Sincerely,] : Sincerely, [FreeTextEntry2] : Iman Oneill [FreeTextEntry3] :  Harry Guerra MD FACP FCCP Pulmonary Diseases and Critical Care Medicine. Chief, Pulmonary Diseases, [facility]

## 2025-03-05 NOTE — CONSULT LETTER
[Dear  ___] : Dear  [unfilled], [Consult Letter:] : I had the pleasure of evaluating your patient, [unfilled]. [Please see my note below.] : Please see my note below. [Consult Closing:] : Thank you very much for allowing me to participate in the care of this patient.  If you have any questions, please do not hesitate to contact me. [Sincerely,] : Sincerely, [FreeTextEntry2] : Iman Oneill [FreeTextEntry3] :  Harry Guerra MD FACP FCCP Pulmonary Diseases and Critical Care Medicine. Chief, Pulmonary Diseases, [facility]

## 2025-03-05 NOTE — CONSULT LETTER
[Dear  ___] : Dear  [unfilled], [Consult Letter:] : I had the pleasure of evaluating your patient, [unfilled]. [Please see my note below.] : Please see my note below. [Consult Closing:] : Thank you very much for allowing me to participate in the care of this patient.  If you have any questions, please do not hesitate to contact me. [Sincerely,] : Sincerely, [FreeTextEntry2] : Iman Oneill [FreeTextEntry3] :  Harry Guerra MD FACP FCCP Pulmonary Diseases and Critical Care Medicine. Chief, Pulmonary Diseases, [facility]

## 2025-03-05 NOTE — DISCUSSION/SUMMARY
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  These are overwhelmingly benign and I have reviewed her CT imaging.  She is asymptomatic and has no other lesions.  I would repeat her CT of chest in 6 months to assess stability.  If it is increasing in size would suggest biopsy.  I will see her again in 6 months following her CT.  PFT was normal.    Time spent reviewing file, taking history , and examining patient: __48_______ minutes

## 2025-03-08 PROBLEM — D36.10 NEUROFIBROMA: Status: ACTIVE | Noted: 2025-03-05

## 2025-03-08 PROBLEM — D36.14: Status: ACTIVE | Noted: 2025-03-05

## 2025-03-08 NOTE — HISTORY OF PRESENT ILLNESS
[de-identified] : 49 year old F with a long-standing h/o obesity presents today for a weight management follow-up. 14 lb weight loss since last visi;; on Wegovy 1mg.  Occasional reflux on Pepcid, had reflux before Wegovy. No abdominal pain, nausea, vomiting,or diarrhea, or visual changes.  Some constipation has increased water intake.  Consuming adequate protein intake with 3meals/day. Daily water intake ~64oz/day and 16 oz of coffee daily. Going to [employer] 2x per week. Sleeping well.  Patient underwent CT scan abdomen pelvis 2/22/2025 for elevated bated bilirubin which was unremarkable except for a 3.7 x 1.7 lesion adjacent to the left proximal ninth rib which was consistent with a benign intercostal neurofibroma.   Starting weight at initial consult: 271 lb Current weight at today's visit: 239 lbs   Anti-obesity medication(s): Wegovy Start date: Sept 2024 Current dose: 1.7 mg Side-effects from anti-obesity medication(s): reflux well controlled, had before Wegovy Obesity comorbidities: none History of bariatric surgery: No

## 2025-03-08 NOTE — REASON FOR VISIT
[Follow-Up Visit] : a follow-up visit for [Morbid Obesity (BMI<40)] : morbid obesity (bmi<40) [Other___] : [unfilled]

## 2025-03-08 NOTE — PHYSICAL EXAM
[Normal] : affect appropriate [de-identified] : Well, healthy appearing adult  [de-identified] : EOMI, no conjunctival injection, anicteric  [de-identified] : Equal chest rise, non-labored respirations, no audible wheezing  [de-identified] : soft, NT, ND, no diastasis or hernias appreciated, incision from Csec well healed  [de-identified] : ABDIRAHMAN

## 2025-03-08 NOTE — ASSESSMENT
[FreeTextEntry1] : 49 year old F with a long-standing h/o obesity presents today for a weight management follow-up. 14 lb weight loss since last visit. on Wegovy 1.7 mg.  CT scan abdomen pelvis done for elevated bilirubin revealed left ninth rib neurofibroma will follow-up with pulmonary.   CT films reviewed  Reviewed guidelines about nutrition and snacking - protein focus diet with 3 meals/day Discussed food choices and timing of meals with front loading early in the day Avoid processed/refined foods Maintain daily exercise regimen incorporating cardio and strength training as tolerated Daily goal 8-10k steps/day GERD seems improved with dietary changes and Pepcid.  If persistent would consider EGD or stopping Wegovy and/or changing to Zepbound.  Reflux did proceed GLP-1.  Patient met with nutritionist Lali Estrada at today's visit, 15 minutes  Continue Wegovy to 1.7mg Follow up in 2 months or sooner Next labs July 2025 Instructed pt to call the office sooner for issues/concerns All questions answered   Additional time spent before and after visit reviewing chart

## 2025-04-29 ENCOUNTER — APPOINTMENT (OUTPATIENT)
Dept: BARIATRICS | Facility: CLINIC | Age: 50
End: 2025-04-29
Payer: COMMERCIAL

## 2025-04-29 VITALS
SYSTOLIC BLOOD PRESSURE: 124 MMHG | WEIGHT: 232 LBS | TEMPERATURE: 97.2 F | DIASTOLIC BLOOD PRESSURE: 88 MMHG | HEART RATE: 65 BPM | OXYGEN SATURATION: 99 % | HEIGHT: 67 IN | BODY MASS INDEX: 36.41 KG/M2

## 2025-04-29 DIAGNOSIS — E66.9 OBESITY, UNSPECIFIED: ICD-10-CM

## 2025-04-29 PROCEDURE — G2211 COMPLEX E/M VISIT ADD ON: CPT | Mod: NC

## 2025-04-29 PROCEDURE — 99214 OFFICE O/P EST MOD 30 MIN: CPT

## 2025-04-30 PROBLEM — E66.9 OBESITY (BMI 30-39.9): Status: ACTIVE | Noted: 2025-04-30

## 2025-04-30 RX ORDER — LORATADINE 10 MG
CAPSULE ORAL
Refills: 0 | Status: ACTIVE | COMMUNITY

## 2025-04-30 NOTE — HISTORY OF PRESENT ILLNESS
[de-identified] : 50 year old F with a long-standing h/o obesity presents today for a weight management follow-up. 6 lb weight loss since last visit on Wegovy 1.7 mg.  No abdominal pain, nausea, vomiting or diarrhea, or visual changes.  Some constipation has increased water intake.  Consuming adequate protein intake with 3meals/day. Daily water intake ~64oz/day and 16 oz of coffee daily. Going to [employer] 2x per week and walking from. Sleeping well.   Starting weight at initial consult: 271 lb Current weight at today's visit: 233 lbs   Anti-obesity medication(s): Wegovy Start date: Sept 2024 Current dose: 1.7 mg Side-effects from anti-obesity medication(s): reflux well controlled, had before Wegovy Obesity comorbidities: none History of bariatric surgery: No

## 2025-04-30 NOTE — ASSESSMENT
[FreeTextEntry1] : 50 year old F with a long-standing h/o obesity presents today for a weight management follow-up. 6 lb weight loss since last visit. on Wegovy 1.7 mg.    Last labs: January 31, 2025 Labs due: June July 2025 (to be done with endocrinology labs)  Reviewed guidelines about nutrition and snacking - protein focus diet with 3 meals/day Discussed food choices and timing of meals with front loading early in the day Avoid processed/refined foods Maintain daily exercise regimen incorporating cardio and strength training, [employer] Daily goal 8-10k steps/day Follow-up with endocrinology June 2025  Increase Wegovy to 2.4 mg/week x 3 months Consider switching to Zepbound if patient not losing weight Follow up in 2 months or sooner Next labs June 2025 Instructed pt to call the office sooner for issues/concerns All questions answered   Additional time spent before and after visit reviewing chart

## 2025-04-30 NOTE — PHYSICAL EXAM
[Normal] : affect appropriate [de-identified] : Well, healthy appearing adult  [de-identified] : EOMI, no conjunctival injection, anicteric  [de-identified] : soft, NT, ND, no diastasis or hernias appreciated [de-identified] : ABDIRAHMAN

## 2025-05-30 ENCOUNTER — LABORATORY RESULT (OUTPATIENT)
Age: 50
End: 2025-05-30

## 2025-06-11 ENCOUNTER — APPOINTMENT (OUTPATIENT)
Dept: ENDOCRINOLOGY | Facility: CLINIC | Age: 50
End: 2025-06-11
Payer: COMMERCIAL

## 2025-06-11 VITALS
HEART RATE: 60 BPM | WEIGHT: 232 LBS | BODY MASS INDEX: 36.41 KG/M2 | OXYGEN SATURATION: 98 % | DIASTOLIC BLOOD PRESSURE: 90 MMHG | HEIGHT: 67 IN | SYSTOLIC BLOOD PRESSURE: 152 MMHG

## 2025-06-11 PROCEDURE — G2211 COMPLEX E/M VISIT ADD ON: CPT | Mod: NC

## 2025-06-11 PROCEDURE — 99214 OFFICE O/P EST MOD 30 MIN: CPT

## 2025-06-11 NOTE — HISTORY OF PRESENT ILLNESS
[FreeTextEntry1] : Ms. CASEY WATSON is a 50 year old female  with a past medical history of Hashimoto's hypothyroidism, Vitamin D deficiency, B12 deficiency who presents for management.  Patient feels well overall.  Patient has been on Wegovy over the last year and has lost 39 pounds.  She is doing [place] [employer] twice a week and walking frequently with her dog.  She is on a calorie restrictive diet.  Her dose was reduced to levothyroxine 112 mcg.

## 2025-06-11 NOTE — ASSESSMENT
[FreeTextEntry1] : 49 year old female  with a past medical history of Hashimoto's hypothyroidism, Vitamin D deficiency, B12 deficiency who presents for management  1. Hypothyroidism  Hashimoto's Thyroiditis  Levels are stable Continue levothyroxine 112 mcg QD Continue liothyronine 5 mcg daily  2.  Vitamin B-12 deficiency Stable  3. vitamin D deficiency Stable  4.  Obesity Following with weight loss program  Follow-up in 6 months

## 2025-06-20 ENCOUNTER — APPOINTMENT (OUTPATIENT)
Dept: OBGYN | Facility: CLINIC | Age: 50
End: 2025-06-20
Payer: COMMERCIAL

## 2025-06-20 VITALS
OXYGEN SATURATION: 98 % | HEIGHT: 67 IN | WEIGHT: 230 LBS | DIASTOLIC BLOOD PRESSURE: 80 MMHG | HEART RATE: 85 BPM | RESPIRATION RATE: 14 BRPM | BODY MASS INDEX: 36.1 KG/M2 | SYSTOLIC BLOOD PRESSURE: 122 MMHG

## 2025-06-20 PROBLEM — Z01.411 ENCOUNTER FOR WELL WOMAN EXAM WITH ABNORMAL FINDINGS: Status: ACTIVE | Noted: 2025-06-20

## 2025-06-20 PROCEDURE — 99459 PELVIC EXAMINATION: CPT

## 2025-06-20 PROCEDURE — 99396 PREV VISIT EST AGE 40-64: CPT

## 2025-06-20 NOTE — PLAN
[FreeTextEntry1] : I have spent 40 minutes of time on this encounter. Greater than 50% of the face-to-face encounter time was spent on counseling and/or coordination of care for examination findings, differential, testing, management, and planning. 10 minutes were allotted to discussing the depression screening.    Yearly breast cancer screening with no current clinical or radiographic concerns. The patient was reminded regarding future well breast and general healthcare, breast cancer risk reduction, the importance of self-examination, and the need for follow-up. She was again reminded of the need to take Vit D3 2500 IU daily or to keep a Vit D level above 30, and Tumeric 1000 mg daily with Black Pepper. Plan continued yearly imaging and breast follow-up, sooner as needed. I counseled the patient on current recommendations to reduce breast cancer risk including but not inclusive to regular exercise 20-30 minutes 3-4 times a week, low-fat diet, limiting alcohol consumption, maintenance of ideal body weight, yearly imaging, and self-breast awareness. Questions answered. I encouraged in light of Covid 19, social distancing, frequent hand washing, and precautions to stay healthy.   1) Self-breast exam instructions, and calcium supplementation discussed with the patient. 2) Mammography, lipid profile assessment, TSH screening, fasting glucose testing, and colonoscopy screening were discussed with the patient. Vitamin D supplementation. 3) Maintain healthy weight. 4) Regular health maintenance with PCP. 5) Remain tobacco free. 6) Limit alcohol intake to less than 5 drinks per week.  7) Osteoporosis screening. 8) Annual cholesterol screening.  9) Annual influenza vaccine. The importance of routine physical activity was reviewed and a goal of 150 minutes of moderately vigorous exercise per week was endorsed.   All questions were answered.

## 2025-06-20 NOTE — HISTORY OF PRESENT ILLNESS
[LMP unknown] : LMP unknown [postmenopausal] : postmenopausal [N] : Patient is not sexually active [Y] : Positive pregnancy history [No] : Patient does not have concerns regarding sex [Previously active] : previously active [Men] : men [FreeTextEntry1] : Pt presents today for an annual exam. Pt is doing well. Pt has started Wegovy and [place] [employer] twice a week, and so far, has lost about 40 pounds. We reviewed together in detail her past medical and surgical histories, allergies and medication usage, and social and family history. All questions were answered in easy-to-understand language.  [Mammogramdate] : 06/28/2024 [BreastSonogramDate] : 06/28/2024 [PapSmeardate] : 06/19/2024 [BoneDensityDate] : 06/28/2024 [ColonoscopyDate] : 12/09/2024 [TextBox_78] : No history of HPV [PGHxTotal] : 3 [[facility] : 3 [FreeTextEntry2] : Not at this time  Skin normal color for race, warm, dry and intact. No evidence of rash.

## 2025-06-20 NOTE — PHYSICAL EXAM
[Chaperoned Physical Exam] : A chaperone was present in the examining room during all aspects of the physical examination. [Appropriately responsive] : appropriately responsive [Alert] : alert [No Acute Distress] : no acute distress [No Lymphadenopathy] : no lymphadenopathy [Soft] : soft [Non-tender] : non-tender [Non-distended] : non-distended [No HSM] : No HSM [No Lesions] : no lesions [No Mass] : no mass [Oriented x3] : oriented x3 [Examination Of The Breasts] : a normal appearance [No Masses] : no breast masses were palpable [Labia Majora] : normal [Labia Minora] : normal [Normal] : normal [Uterine Adnexae] : normal [Exam Deferred] : was deferred [FreeTextEntry2] : This note was written by Melissa Oleary actively solely DR. EVERETT M.D.   All medical record entries made by the Scribe were at my, DR. EVERETT M.D., direction and personally dictated by me during this visit. I have personally reviewed the chart and agree that the record reflects my personal performance of the history, physical exam, assessment, and plan.  [FreeTextEntry9] : Pt had a recent colonoscopy.

## 2025-06-23 LAB — HPV HIGH+LOW RISK DNA PNL CVX: NOT DETECTED

## 2025-06-24 ENCOUNTER — APPOINTMENT (OUTPATIENT)
Dept: BARIATRICS | Facility: CLINIC | Age: 50
End: 2025-06-24
Payer: COMMERCIAL

## 2025-06-24 VITALS
HEIGHT: 67 IN | OXYGEN SATURATION: 98 % | DIASTOLIC BLOOD PRESSURE: 78 MMHG | BODY MASS INDEX: 35.57 KG/M2 | HEART RATE: 74 BPM | TEMPERATURE: 97.2 F | WEIGHT: 226.63 LBS | SYSTOLIC BLOOD PRESSURE: 118 MMHG

## 2025-06-24 LAB — CYTOLOGY CVX/VAG DOC THIN PREP: NORMAL

## 2025-06-24 PROCEDURE — G2211 COMPLEX E/M VISIT ADD ON: CPT | Mod: NC

## 2025-06-24 PROCEDURE — 99214 OFFICE O/P EST MOD 30 MIN: CPT

## 2025-06-26 NOTE — PHYSICAL EXAM
[Normal] : affect appropriate [de-identified] : Well, healthy appearing adult  [de-identified] : EOMI, no conjunctival injection, anicteric  [de-identified] : Equal chest rise, non-labored respirations, no audible wheezing.  [de-identified] : soft, NT, ND, no diastasis or hernias appreciated [de-identified] : ABDIRAHMAN

## 2025-06-26 NOTE — ASSESSMENT
[FreeTextEntry1] : 50 year old F with a long-standing h/o obesity presents today for a follow up on weight management medication Wegovy. 7 lb weight loss since last visit about 2 months ago.   Last labs: 06/2025 Labs due: 12/2025  Reviewed guidelines about nutrition and snacking - protein focus diet with 3 meals/day Discussed food choices and timing of meals with front loading early in the day Avoid processed/refined foods Maintain daily exercise regimen incorporating cardio and strength training, [employer] Daily goal 8-10k steps/day  Continue Wegovy to 2.4 mg/week x 3 months Advised patient to see a lipidologist for persistently elevated lipids   Continue follow up with endocrinologist, Dr Laws Follow up in 3 months or sooner  Instructed pt to call the office sooner for issues/concerns All questions answered   Additional time spent before and after visit reviewing chart

## 2025-06-26 NOTE — PHYSICAL EXAM
[Normal] : affect appropriate [de-identified] : Well, healthy appearing adult  [de-identified] : EOMI, no conjunctival injection, anicteric  [de-identified] : Equal chest rise, non-labored respirations, no audible wheezing.  [de-identified] : soft, NT, ND, no diastasis or hernias appreciated [de-identified] : ABDIRAHMAN

## 2025-06-26 NOTE — ADDENDUM
[FreeTextEntry1] : I, Anabell Rainey acted solely as a scribe for Dr. Iman Art on this date 06/24/2025.    All medical record entries made by the Scribe were at my, Dr. Iman Art, direction and personally dictated by me on 06/24/2025. I have reviewed the chart and agree that the record accurately reflects my personal performance of the history, physical exam, assessment and plan. I have also personally directed, reviewed, and agreed with the chart.

## 2025-06-26 NOTE — HISTORY OF PRESENT ILLNESS
[de-identified] : 50 year old F with a long-standing h/o obesity presents today for a follow up on weight management medication Wegovy. 7 lb weight loss since last visit about 2 months ago. Reports mild constipation improved by Miralax and increased fluid intake, but otherwise no abdominal pain, nausea, vomiting, diarrhea, visual changes, or mood changes. Trying to consume adequate protein intake with 3meals/day. Daily water intake ~64-80oz/day and 2 cups of coffee daily. Walking 3-5x/wk, cardio 2x/week, and strength training 2x/wk at [employer]. Sleeping well (6-7 hours).   Starting weight at initial consult: 271 lb Current weight at today's visit: 226 lbs Goal weight: under 200 lbs   Anti-obesity medication(s): Wegovy Start date: Sept 2024 Current dose: 2.4 mg Side-effects from anti-obesity medication(s): mild constipation Obesity comorbidities: Hashimotos, hypothyroidism History of bariatric surgery: No

## 2025-06-26 NOTE — HISTORY OF PRESENT ILLNESS
[de-identified] : 50 year old F with a long-standing h/o obesity presents today for a follow up on weight management medication Wegovy. 7 lb weight loss since last visit about 2 months ago. Reports mild constipation improved by Miralax and increased fluid intake, but otherwise no abdominal pain, nausea, vomiting, diarrhea, visual changes, or mood changes. Trying to consume adequate protein intake with 3meals/day. Daily water intake ~64-80oz/day and 2 cups of coffee daily. Walking 3-5x/wk, cardio 2x/week, and strength training 2x/wk at [employer]. Sleeping well (6-7 hours).   Starting weight at initial consult: 271 lb Current weight at today's visit: 226 lbs Goal weight: under 200 lbs   Anti-obesity medication(s): Wegovy Start date: Sept 2024 Current dose: 2.4 mg Side-effects from anti-obesity medication(s): mild constipation Obesity comorbidities: Hashimotos, hypothyroidism History of bariatric surgery: No

## 2025-06-26 NOTE — HISTORY OF PRESENT ILLNESS
[de-identified] : 50 year old F with a long-standing h/o obesity presents today for a follow up on weight management medication Wegovy. 7 lb weight loss since last visit about 2 months ago. Reports mild constipation improved by Miralax and increased fluid intake, but otherwise no abdominal pain, nausea, vomiting, diarrhea, visual changes, or mood changes. Trying to consume adequate protein intake with 3meals/day. Daily water intake ~64-80oz/day and 2 cups of coffee daily. Walking 3-5x/wk, cardio 2x/week, and strength training 2x/wk at [employer]. Sleeping well (6-7 hours).   Starting weight at initial consult: 271 lb Current weight at today's visit: 226 lbs Goal weight: under 200 lbs   Anti-obesity medication(s): Wegovy Start date: Sept 2024 Current dose: 2.4 mg Side-effects from anti-obesity medication(s): mild constipation Obesity comorbidities: Hashimotos, hypothyroidism History of bariatric surgery: No

## 2025-06-26 NOTE — PHYSICAL EXAM
[Normal] : affect appropriate [de-identified] : Well, healthy appearing adult  [de-identified] : EOMI, no conjunctival injection, anicteric  [de-identified] : Equal chest rise, non-labored respirations, no audible wheezing.  [de-identified] : soft, NT, ND, no diastasis or hernias appreciated [de-identified] : ABDIRAHMAN

## 2025-07-01 ENCOUNTER — NON-APPOINTMENT (OUTPATIENT)
Age: 50
End: 2025-07-01

## 2025-07-25 ENCOUNTER — RX RENEWAL (OUTPATIENT)
Age: 50
End: 2025-07-25

## 2025-07-25 RX ORDER — LIOTHYRONINE SODIUM 5 UG/1
5 TABLET ORAL
Qty: 90 | Refills: 3 | Status: ACTIVE | COMMUNITY
Start: 2025-07-25 | End: 1900-01-01

## 2025-08-11 ENCOUNTER — APPOINTMENT (OUTPATIENT)
Dept: CARDIOLOGY | Facility: CLINIC | Age: 50
End: 2025-08-11
Payer: COMMERCIAL

## 2025-08-11 VITALS
DIASTOLIC BLOOD PRESSURE: 76 MMHG | RESPIRATION RATE: 16 BRPM | WEIGHT: 220 LBS | TEMPERATURE: 97.4 F | HEART RATE: 58 BPM | BODY MASS INDEX: 34.53 KG/M2 | OXYGEN SATURATION: 100 % | SYSTOLIC BLOOD PRESSURE: 125 MMHG | HEIGHT: 67 IN

## 2025-08-11 DIAGNOSIS — R01.2 OTHER CARDIAC SOUNDS: ICD-10-CM

## 2025-08-11 DIAGNOSIS — E78.00 PURE HYPERCHOLESTEROLEMIA, UNSPECIFIED: ICD-10-CM

## 2025-08-11 PROCEDURE — 99203 OFFICE O/P NEW LOW 30 MIN: CPT

## 2025-08-11 PROCEDURE — G2211 COMPLEX E/M VISIT ADD ON: CPT | Mod: NC

## 2025-08-11 RX ORDER — ROSUVASTATIN CALCIUM 20 MG/1
20 TABLET, FILM COATED ORAL DAILY
Qty: 90 | Refills: 3 | Status: ACTIVE | COMMUNITY
Start: 2025-08-11 | End: 1900-01-01

## 2025-08-23 ENCOUNTER — APPOINTMENT (OUTPATIENT)
Dept: CT IMAGING | Facility: CLINIC | Age: 50
End: 2025-08-23

## 2025-08-23 PROCEDURE — 71250 CT THORAX DX C-: CPT

## 2025-09-10 ENCOUNTER — APPOINTMENT (OUTPATIENT)
Facility: CLINIC | Age: 50
End: 2025-09-10

## 2025-09-10 VITALS
HEART RATE: 60 BPM | WEIGHT: 222 LBS | DIASTOLIC BLOOD PRESSURE: 83 MMHG | OXYGEN SATURATION: 99 % | RESPIRATION RATE: 14 BRPM | SYSTOLIC BLOOD PRESSURE: 128 MMHG | TEMPERATURE: 98.7 F | BODY MASS INDEX: 34.84 KG/M2 | HEIGHT: 67 IN

## 2025-09-10 DIAGNOSIS — D36.14 BENIGN NEOPLASM OF PERIPHERAL NERVES AND AUTONOMIC NERVOUS SYSTEM OF THORAX: ICD-10-CM

## 2025-09-10 DIAGNOSIS — E66.9 OBESITY, UNSPECIFIED: ICD-10-CM

## 2025-09-10 PROCEDURE — 99203 OFFICE O/P NEW LOW 30 MIN: CPT

## 2025-09-15 ENCOUNTER — APPOINTMENT (OUTPATIENT)
Facility: CLINIC | Age: 50
End: 2025-09-15

## 2025-09-18 ENCOUNTER — APPOINTMENT (OUTPATIENT)
Dept: BARIATRICS | Facility: CLINIC | Age: 50
End: 2025-09-18
Payer: COMMERCIAL

## 2025-09-18 VITALS — HEIGHT: 67 IN | WEIGHT: 222 LBS | BODY MASS INDEX: 34.84 KG/M2

## 2025-09-18 DIAGNOSIS — Z76.0 ENCOUNTER FOR ISSUE OF REPEAT PRESCRIPTION: ICD-10-CM

## 2025-09-18 DIAGNOSIS — E66.9 OBESITY, UNSPECIFIED: ICD-10-CM

## 2025-09-18 DIAGNOSIS — Z71.82 EXERCISE COUNSELING: ICD-10-CM

## 2025-09-18 DIAGNOSIS — R63.4 ABNORMAL WEIGHT LOSS: ICD-10-CM

## 2025-09-18 DIAGNOSIS — Z79.899 OTHER LONG TERM (CURRENT) DRUG THERAPY: ICD-10-CM

## 2025-09-18 DIAGNOSIS — Z76.89 PERSONS ENCOUNTERING HEALTH SERVICES IN OTHER SPECIFIED CIRCUMSTANCES: ICD-10-CM

## 2025-09-18 PROCEDURE — G2211 COMPLEX E/M VISIT ADD ON: CPT | Mod: NC,95

## 2025-09-18 PROCEDURE — 99214 OFFICE O/P EST MOD 30 MIN: CPT | Mod: 95

## 2025-09-18 RX ORDER — MULTIVITAMIN
TABLET ORAL
Refills: 0 | Status: ACTIVE | COMMUNITY